# Patient Record
Sex: FEMALE | Race: WHITE | NOT HISPANIC OR LATINO | Employment: UNEMPLOYED | URBAN - METROPOLITAN AREA
[De-identification: names, ages, dates, MRNs, and addresses within clinical notes are randomized per-mention and may not be internally consistent; named-entity substitution may affect disease eponyms.]

---

## 2018-04-03 ENCOUNTER — HOSPITAL ENCOUNTER (EMERGENCY)
Facility: OTHER | Age: 23
Discharge: HOME | End: 2018-04-03
Attending: EMERGENCY MEDICINE
Payer: MEDICAID

## 2018-04-03 VITALS
OXYGEN SATURATION: 97 % | HEART RATE: 57 BPM | RESPIRATION RATE: 16 BRPM | SYSTOLIC BLOOD PRESSURE: 101 MMHG | WEIGHT: 214 LBS | DIASTOLIC BLOOD PRESSURE: 52 MMHG | HEIGHT: 63 IN | TEMPERATURE: 98 F | BODY MASS INDEX: 37.92 KG/M2

## 2018-04-03 DIAGNOSIS — R10.9 ABDOMINAL PAIN DURING PREGNANCY IN FIRST TRIMESTER: Primary | ICD-10-CM

## 2018-04-03 DIAGNOSIS — O26.891 ABDOMINAL PAIN DURING PREGNANCY IN FIRST TRIMESTER: Primary | ICD-10-CM

## 2018-04-03 LAB
ALBUMIN SERPL BCP-MCNC: 3.4 G/DL
ALP SERPL-CCNC: 58 U/L
ALT SERPL W/O P-5'-P-CCNC: 19 U/L
ANION GAP SERPL CALC-SCNC: 8 MMOL/L
AST SERPL-CCNC: 16 U/L
B-HCG UR QL: POSITIVE
BASOPHILS # BLD AUTO: 0.04 K/UL
BASOPHILS NFR BLD: 0.3 %
BILIRUB SERPL-MCNC: 0.2 MG/DL
BILIRUB UR QL STRIP: NEGATIVE
BUN SERPL-MCNC: 8 MG/DL
CALCIUM SERPL-MCNC: 9.3 MG/DL
CHLORIDE SERPL-SCNC: 108 MMOL/L
CLARITY UR: CLEAR
CO2 SERPL-SCNC: 20 MMOL/L
COLOR UR: YELLOW
CREAT SERPL-MCNC: 0.6 MG/DL
CTP QC/QA: YES
DIFFERENTIAL METHOD: ABNORMAL
EOSINOPHIL # BLD AUTO: 0.1 K/UL
EOSINOPHIL NFR BLD: 1.2 %
ERYTHROCYTE [DISTWIDTH] IN BLOOD BY AUTOMATED COUNT: 13.9 %
EST. GFR  (AFRICAN AMERICAN): >60 ML/MIN/1.73 M^2
EST. GFR  (NON AFRICAN AMERICAN): >60 ML/MIN/1.73 M^2
GLUCOSE SERPL-MCNC: 93 MG/DL
GLUCOSE UR QL STRIP: NEGATIVE
HCT VFR BLD AUTO: 36.8 %
HGB BLD-MCNC: 12 G/DL
HGB UR QL STRIP: NEGATIVE
KETONES UR QL STRIP: NEGATIVE
LEUKOCYTE ESTERASE UR QL STRIP: NEGATIVE
LIPASE SERPL-CCNC: 11 U/L
LYMPHOCYTES # BLD AUTO: 3.5 K/UL
LYMPHOCYTES NFR BLD: 29.5 %
MCH RBC QN AUTO: 28 PG
MCHC RBC AUTO-ENTMCNC: 32.6 G/DL
MCV RBC AUTO: 86 FL
MONOCYTES # BLD AUTO: 0.9 K/UL
MONOCYTES NFR BLD: 7.2 %
NEUTROPHILS # BLD AUTO: 7.3 K/UL
NEUTROPHILS NFR BLD: 61.6 %
NITRITE UR QL STRIP: NEGATIVE
PH UR STRIP: 8.5 [PH] (ref 5–8)
PLATELET # BLD AUTO: 270 K/UL
PMV BLD AUTO: 10.3 FL
POTASSIUM SERPL-SCNC: 4 MMOL/L
PROT SERPL-MCNC: 6.5 G/DL
PROT UR QL STRIP: NEGATIVE
RBC # BLD AUTO: 4.29 M/UL
SODIUM SERPL-SCNC: 136 MMOL/L
SP GR UR STRIP: 1.02 (ref 1–1.03)
URN SPEC COLLECT METH UR: NORMAL
UROBILINOGEN UR STRIP-ACNC: NEGATIVE EU/DL
WBC # BLD AUTO: 11.88 K/UL

## 2018-04-03 PROCEDURE — 81025 URINE PREGNANCY TEST: CPT | Performed by: PHYSICIAN ASSISTANT

## 2018-04-03 PROCEDURE — 85025 COMPLETE CBC W/AUTO DIFF WBC: CPT

## 2018-04-03 PROCEDURE — 99283 EMERGENCY DEPT VISIT LOW MDM: CPT | Mod: 25

## 2018-04-03 PROCEDURE — 83690 ASSAY OF LIPASE: CPT

## 2018-04-03 PROCEDURE — 80053 COMPREHEN METABOLIC PANEL: CPT

## 2018-04-03 PROCEDURE — 25000003 PHARM REV CODE 250: Performed by: PHYSICIAN ASSISTANT

## 2018-04-03 PROCEDURE — 81003 URINALYSIS AUTO W/O SCOPE: CPT

## 2018-04-03 RX ORDER — ONDANSETRON 4 MG/1
4 TABLET, ORALLY DISINTEGRATING ORAL
Status: COMPLETED | OUTPATIENT
Start: 2018-04-03 | End: 2018-04-03

## 2018-04-03 RX ORDER — ACETAMINOPHEN 500 MG
1000 TABLET ORAL
Status: COMPLETED | OUTPATIENT
Start: 2018-04-03 | End: 2018-04-03

## 2018-04-03 RX ORDER — ACETAMINOPHEN 500 MG
500 TABLET ORAL EVERY 6 HOURS PRN
Qty: 20 TABLET | Refills: 0 | Status: SHIPPED | OUTPATIENT
Start: 2018-04-03

## 2018-04-03 RX ADMIN — ONDANSETRON 4 MG: 4 TABLET, ORALLY DISINTEGRATING ORAL at 03:04

## 2018-04-03 RX ADMIN — ACETAMINOPHEN 1000 MG: 500 TABLET ORAL at 03:04

## 2018-04-03 NOTE — ED NOTES
Pt reporting she is 10 weeks pregnant, presenting to ED with reporting midline abdominal pain with pain extending into lower midline pelvic region x 2 days. Denies nausea, vomiting, diarrhea, or vaginal bleeding. Pt AAOx4 and appropriate at this time. Respirations even and unlabored. No acute distress noted.

## 2018-04-03 NOTE — ED PROVIDER NOTES
Encounter Date: 4/3/2018       History     Chief Complaint   Patient presents with    Abdominal Pain     10 weeks pregnant presents with abdominal pain left side and pelvic region. NO back pain, NO bleeding.      Patient is a 22 y.o. Female,  at approximately 10wga, presenting to the emergency department with complaints of abdominal pain in pregnancy.  The patient states her symptoms started approximately 3 days ago and have progressively worsened.  She states that they occur intermittently.  He states the pain is most significant in the left upper quadrant in addition to her lower abdomen.  She denies dysuria hematuria.  She denies any vaginal bleeding.  She states that she has not seen a GYN for the past 4 weeks that she is early in the process of relocating her home.  She denies any alcohol use, but does report daily tobacco use in pregnancy.  She denies any drug use.      The history is provided by the patient.     Review of patient's allergies indicates:   Allergen Reactions    Ceclor [cefaclor]      Past Medical History:   Diagnosis Date    Asthma     Polycystic ovarian disease      Past Surgical History:   Procedure Laterality Date     SECTION      TONSILLECTOMY       Family History   Problem Relation Age of Onset    Glaucoma Maternal Grandfather     Glaucoma Paternal Grandfather      Social History   Substance Use Topics    Smoking status: Current Every Day Smoker     Packs/day: 0.50     Types: Cigarettes    Smokeless tobacco: Never Used    Alcohol use Yes     Review of Systems   Constitutional: Negative for activity change, appetite change, chills, fatigue and fever.   HENT: Negative for congestion, rhinorrhea and sore throat.    Eyes: Negative for photophobia and visual disturbance.   Respiratory: Negative for cough, shortness of breath and wheezing.    Cardiovascular: Negative for chest pain.   Gastrointestinal: Positive for abdominal pain. Negative for diarrhea, nausea and  vomiting.   Genitourinary: Negative for dysuria, hematuria and urgency.   Musculoskeletal: Negative for back pain, myalgias and neck pain.   Skin: Negative for color change and wound.   Neurological: Negative for weakness and headaches.   Psychiatric/Behavioral: Negative for agitation and confusion.       Physical Exam     Initial Vitals [04/03/18 1438]   BP Pulse Resp Temp SpO2   108/66 63 17 99 °F (37.2 °C) 97 %      MAP       80         Physical Exam    Nursing note and vitals reviewed.  Constitutional: Vital signs are normal. She appears well-developed and well-nourished. She is not diaphoretic. She is Obese . She is cooperative.  Non-toxic appearance. She does not have a sickly appearance. She does not appear ill. No distress.   Well appearing, obese,  female unaccompanied in the emergency room.  Speaking clear and full sentences.  No acute distress.   HENT:   Head: Normocephalic and atraumatic.   Right Ear: External ear normal.   Left Ear: External ear normal.   Nose: Nose normal.   Mouth/Throat: Oropharynx is clear and moist.   Eyes: Conjunctivae and EOM are normal.   Neck: Normal range of motion. Neck supple.   Cardiovascular: Normal rate, regular rhythm and normal heart sounds.   Pulmonary/Chest: Breath sounds normal. No respiratory distress. She has no wheezes.   Abdominal: Soft. Bowel sounds are normal. She exhibits no distension. There is tenderness in the suprapubic area. There is no rebound and no guarding.   Musculoskeletal: Normal range of motion.   Neurological: She is alert and oriented to person, place, and time. GCS eye subscore is 4. GCS verbal subscore is 5. GCS motor subscore is 6.   Skin: Skin is warm.   Psychiatric: She has a normal mood and affect. Her behavior is normal. Judgment and thought content normal.         ED Course   Procedures  Labs Reviewed   CBC W/ AUTO DIFFERENTIAL - Abnormal; Notable for the following:        Result Value    Hematocrit 36.8 (*)     All other  components within normal limits   COMPREHENSIVE METABOLIC PANEL - Abnormal; Notable for the following:     CO2 20 (*)     Albumin 3.4 (*)     All other components within normal limits   POCT URINE PREGNANCY - Abnormal; Notable for the following:     POC Preg Test, Ur Positive (*)     All other components within normal limits   URINALYSIS   LIPASE             Medical Decision Making:   Initial Assessment:   Urgent evaluation of a 22-year-old female,  at approximately 10 weeks' gestational age, presenting to the emergency department with complaints of abdominal pain in pregnancy.  Patient is afebrile, nontoxic appearing, hemodynamically stable.  Physical exam reveals tenderness to palpation of the suprapubic abdomen is no rebound, guarding, mass.  Will plan for labs, UA, Tylenol and reassess.  Clinical Tests:   Lab Tests: Ordered and Reviewed  The following lab test(s) were unremarkable: UPT, Urinalysis, CBC, CMP and Lipase  ED Management:  UPT is positive. CBC shows no leukocytosis, normal H&H.  CMP shows no significant findings.  Lipase is normal.  UA shows no significant findings.  On reassessment, patient reports full resolution of her symptoms.  At this time, no further testing or imaging is warranted.  She is given a prescription for Tylenol and prenatal vitamins.  Counseled on the importance of smoking cessation in pregnancy.  Advised to obtain close follow-up with OB/GYN. The patient was instructed to follow up with a primary care provider in 2 days or to return to the emergency department for worsening symptoms. The treatment plan was discussed with the patient who demonstrated understanding and comfort with plan. The patient's history, physical exam, and plan of care was discussed with and agreed upon with my supervising physician.    Other:   I have discussed this case with another health care provider.       <> Summary of the Discussion: Dr. Marte  This note was created using Dragon Medical  Dictation. There may be typographical errors secondary to dictation.                       Clinical Impression:     1. Abdominal pain during pregnancy in first trimester       Disposition:   Disposition: Discharged  Condition: Stable                        Nanette Cisneros PA-C  04/03/18 7686

## 2018-05-08 ENCOUNTER — OFFICE VISIT (OUTPATIENT)
Dept: OBSTETRICS AND GYNECOLOGY | Facility: CLINIC | Age: 23
End: 2018-05-08
Payer: MEDICAID

## 2018-05-08 ENCOUNTER — DOCUMENTATION ONLY (OUTPATIENT)
Dept: OBSTETRICS AND GYNECOLOGY | Facility: CLINIC | Age: 23
End: 2018-05-08

## 2018-05-08 VITALS
SYSTOLIC BLOOD PRESSURE: 104 MMHG | HEIGHT: 63 IN | BODY MASS INDEX: 38.95 KG/M2 | WEIGHT: 219.81 LBS | DIASTOLIC BLOOD PRESSURE: 62 MMHG

## 2018-05-08 DIAGNOSIS — Z32.01 POSITIVE PREGNANCY TEST: Primary | ICD-10-CM

## 2018-05-08 DIAGNOSIS — O21.9 NAUSEA AND VOMITING DURING PREGNANCY: ICD-10-CM

## 2018-05-08 DIAGNOSIS — O34.219 MATERNAL CARE FOR SCAR FROM PREVIOUS CESAREAN DELIVERY, UNSPECIFIED PRIOR CESAREAN DELIVERY TYPE: ICD-10-CM

## 2018-05-08 DIAGNOSIS — O35.2XX0 MATERNAL CARE FOR SUSPECTED HEREDITARY DISEASE IN FETUS, SINGLE OR UNSPECIFIED FETUS: ICD-10-CM

## 2018-05-08 DIAGNOSIS — O99.342 MENTAL DISORDER AFFECTING PREGNANCY IN SECOND TRIMESTER: ICD-10-CM

## 2018-05-08 DIAGNOSIS — O09.292 PREGNANCY WITH POOR REPRODUCTIVE HISTORY IN SECOND TRIMESTER: ICD-10-CM

## 2018-05-08 DIAGNOSIS — O99.332 PREGNANCY COMPLICATED BY TOBACCO USE IN SECOND TRIMESTER: ICD-10-CM

## 2018-05-08 PROBLEM — O99.340 MENTAL DISORDER AFFECTING PREGNANCY, ANTEPARTUM: Status: ACTIVE | Noted: 2018-05-08

## 2018-05-08 PROBLEM — O99.330 TOBACCO USE AFFECTING PREGNANCY, ANTEPARTUM: Status: ACTIVE | Noted: 2018-05-08

## 2018-05-08 PROBLEM — O09.299 PREGNANCY WITH POOR REPRODUCTIVE HISTORY, ANTEPARTUM: Status: ACTIVE | Noted: 2018-05-08

## 2018-05-08 LAB
B-HCG UR QL: POSITIVE
CTP QC/QA: YES

## 2018-05-08 PROCEDURE — 87491 CHLMYD TRACH DNA AMP PROBE: CPT

## 2018-05-08 PROCEDURE — 99203 OFFICE O/P NEW LOW 30 MIN: CPT | Mod: S$PBB,TH,, | Performed by: OBSTETRICS & GYNECOLOGY

## 2018-05-08 PROCEDURE — 81025 URINE PREGNANCY TEST: CPT | Mod: PBBFAC,PN | Performed by: OBSTETRICS & GYNECOLOGY

## 2018-05-08 PROCEDURE — 99213 OFFICE O/P EST LOW 20 MIN: CPT | Mod: PBBFAC,TH,PN | Performed by: OBSTETRICS & GYNECOLOGY

## 2018-05-08 PROCEDURE — 88175 CYTOPATH C/V AUTO FLUID REDO: CPT

## 2018-05-08 PROCEDURE — 99999 PR PBB SHADOW E&M-EST. PATIENT-LVL III: CPT | Mod: PBBFAC,,, | Performed by: OBSTETRICS & GYNECOLOGY

## 2018-05-08 PROCEDURE — 87086 URINE CULTURE/COLONY COUNT: CPT

## 2018-05-08 RX ORDER — PYRIDOXINE HCL (VITAMIN B6) 25 MG
25 TABLET ORAL NIGHTLY
Qty: 30 TABLET | Refills: 2 | Status: SHIPPED | OUTPATIENT
Start: 2018-05-08 | End: 2018-08-06

## 2018-05-08 NOTE — PROGRESS NOTES
Chief Complaint   Patient presents with    Amenorrhea       HPI:  22 y.o. female  presents as a new patient for confirmation of pregnancy.    Patient's last menstrual period was 2017 (within weeks).  Patient has irregular cycles.  She often skips cycles.    - Nausea:  Yes  - Vomiting: No  - Cramping: No  - Bleeding:  No    - Patient had u/s at imaging center in [place] that gave an JUANA of 10/24/2018    - Denies family history of bleeding disorders    - Patient's sibling born with lethal genetic condition causing what sounds like hydrocephalus and cardiac defects  - Patient does not remember the name of the diagnosis, but she states that her mom does know the diagnosis    - Prior pregnancy complicated by placental insufficiency, IUGR, and oligohydramnios  - Prior child was 4#11-oz at birth at about 38 weeks EGA  - Denies any blood pressure issues  - Delivery [facility] C/S [facility]    - History of bipolar, depression, and anxiety  - Last saw a psychiatrist 2 years ago  - Previously on zoloft, but not on meds recently    - Desires assistance with tobacco cessation    - Patient's mother with history of breast surgery in her 30s  - Patient is unsure if surgery was for cancer or benign findings    Contraception: None  Pap: No result found, Done today       Past Medical History:   Diagnosis Date    Asthma     Polycystic ovarian disease      Past Surgical History:   Procedure Laterality Date     SECTION      TONSILLECTOMY         Social History   Substance Use Topics    Smoking status: Current Every Day Smoker     Packs/day: 0.50     Types: Cigarettes    Smokeless tobacco: Never Used    Alcohol use No     Family History   Problem Relation Age of Onset    Glaucoma Maternal Grandfather     Glaucoma Paternal Grandfather     Breast cancer Neg Hx     Colon cancer Neg Hx     Ovarian cancer Neg Hx      OB History    Para Term  AB Living   2 0 0         SAB TAB Ectopic Multiple Live  "Births                  # Outcome Date GA Lbr Brent/2nd Weight Sex Delivery Anes PTL Lv   2 Current            1                    MEDICATIONS: Reviewed with patient.  ALLERGIES: Ceclor [cefaclor]     ROS:  Review of Systems   Constitutional: Negative for fever.   Respiratory: Negative for shortness of breath.    Cardiovascular: Negative for chest pain.   Gastrointestinal: Positive for nausea. Negative for abdominal pain and vomiting.   Endocrine: Negative for hot flashes.   Genitourinary: Negative for pelvic pain and vaginal bleeding.   Neurological: Negative for headaches.   Hematological: Does not bruise/bleed easily.   Psychiatric/Behavioral: Negative for depression.   Breast: Negative for breast mass, breast pain, nipple discharge and skin changes      PHYSICAL EXAM:    /62   Ht 5' 3" (1.6 m)   Wt 99.7 kg (219 lb 12.8 oz)   LMP 2017 (Within Weeks)   BMI 38.94 kg/m²     Physical Exam:   Constitutional: She is oriented to person, place, and time. She appears well-developed.   No fever    HENT:   Head: Normocephalic.     Neck: No thyromegaly present.    Cardiovascular: Normal rate.     Pulmonary/Chest: Effort normal. Right breast exhibits no mass, no nipple discharge, no skin change, no tenderness and no swelling. Left breast exhibits no mass, no nipple discharge, no skin change, no tenderness and no swelling. Breasts are symmetrical.        Abdominal: She exhibits no mass. There is no hepatosplenomegaly. There is no tenderness.     Genitourinary: Vagina normal. Uterus is enlarged. Uterus is not tender. Cervix is normal. Right adnexum displays no tenderness and no fullness. Left adnexum displays no tenderness and no fullness. No vaginal discharge found. Additional cervical findings: pap smear done  Genitourinary Comments: External genitalia: Normal  Urethra: No tenderness; normal meatus  Bladder: No tenderness              Lymphadenopathy:     She has no cervical adenopathy.    Neurological: " She is alert and oriented to person, place, and time.     Psychiatric: She has a normal mood and affect.         ASSESSMENT & PLAN:   Positive pregnancy test  -     POCT Urine Pregnancy  -     Liquid-based pap smear, screening  -     C. trachomatis/N. gonorrhoeae by AMP DNA Cervix  -     CBC auto differential; Future; Expected date: 2018  -     Hepatitis B surface antigen; Future; Expected date: 2018  -     HIV-1 and HIV-2 antibodies; Future; Expected date: 2018  -     RPR; Future; Expected date: 2018  -     US MFM Procedure (Viewpoint); Future  -     Rubella antibody, IgG; Future; Expected date: 2018  -     Type & Screen - Ob Profile; Future; Expected date: 2018  -     Urine culture  -     Varicella zoster antibody, IgG; Future; Expected date: 2018  -     prenatal 25-iron fum-folic-dha (PRENATAL-1) -200 mg-mcg-mg Cap; Take 1 capsule by mouth once daily.  Dispense: 30 capsule; Refill: 9  -     Maternal Quad Screen; Future; Expected date: 2018    Nausea and vomiting during pregnancy  -     doxylamine succinate 25 mg tablet; Take 1 tablet (25 mg total) by mouth every evening.  Dispense: 30 tablet; Refill: 2  -     pyridoxine, vitamin B6, (B-6) 25 MG Tab; Take 1 tablet (25 mg total) by mouth every evening.  Dispense: 30 tablet; Refill: 2    Maternal care for suspected hereditary disease in fetus, single or unspecified fetus  -     US MFM Procedure (Viewpoint); Future    Mental disorder affecting pregnancy in second trimester    Maternal care for scar from previous  delivery, unspecified prior  delivery type    Pregnancy with poor reproductive history in second trimester    Pregnancy complicated by tobacco use in second trimester        - UPT positive; +FHTs on doptone  - JUANA=10/24/2018 --> EGA=15.6.  Will use u/s-based JUANA.  Records requested.  - PNV  - Initial prenatal labs  - Pain and bleeding precautions given  - Return to clinic in 4 weeks    -  Aneuploidy screening: Quad screen ordered.  - Patient will find out more information about her siblings diagnosis.  - Patient will likely need MFM consult and possible cffDNA testing once more information is obtained.    - Request c/s records    - Patient will review psychiatrists covered by her insurance and request a referral when she finds one she wants to see  - Counseled patient on risk of  transition with SSRI use in pregnancy balanced versus risk of worsening anxiety and depression in pregnancy    - VitB6 and unisom for N/V    - Patient will likely need serial growth u/s starting at 32 weeks and PNT due to history of IUGR and placental insufficiency in prior pregnancy    - Patient will clarify breast diagnosis with her mother  - If her mother did have breast cancer in her 30s, patient would need early mammogram screening and would be candidate for BRCA testing    NEW PREGNANCY COUNSELING  Patient was counseled today on:  - Routine prenatal blood tests including HIV and anticipated course of prenatal care  - Prenatal vitamins and folic acid  - Weight gain, nutrition, and exercise  - Seafood and mercury  - Properly heating deli and prepared meats and avoiding unrefrigerated deli  meats, cheeses, and milk products,   - Avoiding cat litter and raw meats due to risk of Toxoplasmosis precautions   - Accuracy of the LMP-based JUANA and the value of an early TV-u/s  - Aneuploidy and neural tube screening -- cffDNA, sequential screening, and AFP screen at 15 weeks  - OTC medication in the first trimester  - Harmful effects of smoking, etOH, and recreational drugs  - MFM u/s  at 18-20 weeks.  - Common complaints of pregnancy  - Seat belt use  - Childbirth classes and hospital facilities  - All questions were answered    Total visit time was 30 minutes with greater than 50% of time dedicated to counseling.

## 2018-05-09 LAB
C TRACH DNA SPEC QL NAA+PROBE: NOT DETECTED
N GONORRHOEA DNA SPEC QL NAA+PROBE: NOT DETECTED

## 2018-05-10 LAB — BACTERIA UR CULT: NORMAL

## 2018-05-30 ENCOUNTER — PATIENT MESSAGE (OUTPATIENT)
Dept: MATERNAL FETAL MEDICINE | Facility: CLINIC | Age: 23
End: 2018-05-30

## 2018-05-30 ENCOUNTER — TELEPHONE (OUTPATIENT)
Dept: OBSTETRICS AND GYNECOLOGY | Facility: CLINIC | Age: 23
End: 2018-05-30

## 2018-05-30 NOTE — TELEPHONE ENCOUNTER
----- Message from Karine Dunn RN sent at 5/30/2018  2:51 PM CDT -----  FYI - pt canceled her Anatomy u/s today (it was scheduled for today) - we sent her a message to call us back to r/s    Karine

## 2018-05-31 ENCOUNTER — TELEPHONE (OUTPATIENT)
Dept: OBSTETRICS AND GYNECOLOGY | Facility: CLINIC | Age: 23
End: 2018-05-31

## 2018-05-31 NOTE — TELEPHONE ENCOUNTER
----- Message from Teresa Hong sent at 5/30/2018  3:54 PM CDT -----  Contact: Concepcion  Name of Who is Calling: Concepcion       What is the request in detail: Patient states she is 5 months she states she has a Yeast infection and she states she has having vaginal bleeding she is requesting medication be called in for her        Can the clinic reply by [facility]: NO       What Number to Call Back if not in [facility]:[phone]

## 2018-05-31 NOTE — TELEPHONE ENCOUNTER
Returned pt call and pt stated that she has a yeast infection. Informed pt that she have to come in and be seen. Pt stated that she will call back to schedule appt

## 2018-05-31 NOTE — TELEPHONE ENCOUNTER
Spoke with pt and she stated she believes she has a yeast infection and she is having stabbing pains in her abdomen. Offered her an appt at the [facility] tomorrow. Pt declined and stated she would just go to the ER and if necessary will make an appt next week to see Dr Carson

## 2018-06-02 ENCOUNTER — PATIENT MESSAGE (OUTPATIENT)
Dept: OBSTETRICS AND GYNECOLOGY | Facility: CLINIC | Age: 23
End: 2018-06-02

## 2018-06-02 DIAGNOSIS — O35.2XX0 MATERNAL CARE FOR SUSPECTED HEREDITARY DISEASE IN FETUS, SINGLE OR UNSPECIFIED FETUS: Primary | ICD-10-CM

## 2018-06-02 DIAGNOSIS — Z82.79 FAMILY HISTORY OF OTHER CONGENITAL MALFORMATIONS, DEFORMATIONS AND CHROMOSOMAL ABNORMALITIES: ICD-10-CM

## 2018-06-05 DIAGNOSIS — Z36.89 ENCOUNTER FOR FETAL ANATOMIC SURVEY: Primary | ICD-10-CM

## 2018-06-08 ENCOUNTER — PATIENT MESSAGE (OUTPATIENT)
Dept: OBSTETRICS AND GYNECOLOGY | Facility: CLINIC | Age: 23
End: 2018-06-08

## 2018-06-08 ENCOUNTER — TELEPHONE (OUTPATIENT)
Dept: OBSTETRICS AND GYNECOLOGY | Facility: CLINIC | Age: 23
End: 2018-06-08

## 2018-06-08 NOTE — TELEPHONE ENCOUNTER
----- Message from Emelia Keys sent at 6/8/2018  8:30 AM CDT -----  Contact: ANU  Pt was a no show for her MFM appt. sh

## 2018-06-15 ENCOUNTER — TELEPHONE (OUTPATIENT)
Dept: OBSTETRICS AND GYNECOLOGY | Facility: CLINIC | Age: 23
End: 2018-06-15

## 2018-06-15 NOTE — TELEPHONE ENCOUNTER
----- Message from Kirsten Singh sent at 6/15/2018  2:13 PM CDT -----            Name of Who is Calling: arleth      What is the request in detail: pt. Would like sejal for ob appt.pt. Would like to come in on Tuesday.06/19/18 Please call to discuss      Can the clinic reply by [facility]: no      What [facility]: [phone]

## 2018-06-26 ENCOUNTER — TELEPHONE (OUTPATIENT)
Dept: OBSTETRICS AND GYNECOLOGY | Facility: CLINIC | Age: 23
End: 2018-06-26

## 2018-06-26 NOTE — TELEPHONE ENCOUNTER
----- Message from Emelia Keys sent at 6/26/2018  8:47 AM CDT -----  Contact: ANU  Pt was a no show for her MFM appt. sh

## 2018-06-29 ENCOUNTER — HOSPITAL ENCOUNTER (EMERGENCY)
Facility: OTHER | Age: 23
Discharge: HOME | End: 2018-06-29
Attending: OBSTETRICS & GYNECOLOGY
Payer: MEDICAID

## 2018-06-29 ENCOUNTER — TELEPHONE (OUTPATIENT)
Dept: OBSTETRICS AND GYNECOLOGY | Facility: CLINIC | Age: 23
End: 2018-06-29

## 2018-06-29 DIAGNOSIS — O9A.312: ICD-10-CM

## 2018-06-29 DIAGNOSIS — Z36.89 NST (NON-STRESS TEST) REACTIVE: ICD-10-CM

## 2018-06-29 DIAGNOSIS — T74.91XA DOMESTIC VIOLENCE OF ADULT, INITIAL ENCOUNTER: Primary | ICD-10-CM

## 2018-06-29 LAB
ABO + RH BLD: NORMAL
AMPHET+METHAMPHET UR QL: NEGATIVE
BARBITURATES UR QL SCN>200 NG/ML: NEGATIVE
BASOPHILS # BLD AUTO: 0.01 K/UL
BASOPHILS NFR BLD: 0.1 %
BENZODIAZ UR QL SCN>200 NG/ML: NEGATIVE
BLD GP AB SCN CELLS X3 SERPL QL: NORMAL
BZE UR QL SCN: NEGATIVE
CANNABINOIDS UR QL SCN: NORMAL
CREAT UR-MCNC: 236.7 MG/DL
DIFFERENTIAL METHOD: ABNORMAL
EOSINOPHIL # BLD AUTO: 0.1 K/UL
EOSINOPHIL NFR BLD: 1.1 %
ERYTHROCYTE [DISTWIDTH] IN BLOOD BY AUTOMATED COUNT: 13.7 %
ETHANOL UR-MCNC: <10 MG/DL
HCT VFR BLD AUTO: 38.3 %
HGB BLD-MCNC: 12.8 G/DL
HIV1+2 IGG SERPL QL IA.RAPID: NEGATIVE
LYMPHOCYTES # BLD AUTO: 2.2 K/UL
LYMPHOCYTES NFR BLD: 17 %
MCH RBC QN AUTO: 29.4 PG
MCHC RBC AUTO-ENTMCNC: 33.4 G/DL
MCV RBC AUTO: 88 FL
METHADONE UR QL SCN>300 NG/ML: NEGATIVE
MONOCYTES # BLD AUTO: 0.9 K/UL
MONOCYTES NFR BLD: 6.8 %
NEUTROPHILS # BLD AUTO: 9.8 K/UL
NEUTROPHILS NFR BLD: 74.7 %
OPIATES UR QL SCN: NEGATIVE
PCP UR QL SCN>25 NG/ML: NEGATIVE
PLATELET # BLD AUTO: 264 K/UL
PMV BLD AUTO: 10.7 FL
RBC # BLD AUTO: 4.36 M/UL
TOXICOLOGY INFORMATION: NORMAL
WBC # BLD AUTO: 13.08 K/UL

## 2018-06-29 PROCEDURE — 87086 URINE CULTURE/COLONY COUNT: CPT

## 2018-06-29 PROCEDURE — 59025 FETAL NON-STRESS TEST: CPT | Mod: 26,,, | Performed by: OBSTETRICS & GYNECOLOGY

## 2018-06-29 PROCEDURE — 59025 FETAL NON-STRESS TEST: CPT

## 2018-06-29 PROCEDURE — 85025 COMPLETE CBC W/AUTO DIFF WBC: CPT

## 2018-06-29 PROCEDURE — 86703 HIV-1/HIV-2 1 RESULT ANTBDY: CPT

## 2018-06-29 PROCEDURE — 86850 RBC ANTIBODY SCREEN: CPT

## 2018-06-29 PROCEDURE — 80307 DRUG TEST PRSMV CHEM ANLYZR: CPT

## 2018-06-29 PROCEDURE — 86592 SYPHILIS TEST NON-TREP QUAL: CPT

## 2018-06-29 PROCEDURE — 99284 EMERGENCY DEPT VISIT MOD MDM: CPT | Mod: 25,,, | Performed by: OBSTETRICS & GYNECOLOGY

## 2018-06-29 PROCEDURE — 99285 EMERGENCY DEPT VISIT HI MDM: CPT | Mod: 25
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
## 2018-06-29 NOTE — TELEPHONE ENCOUNTER
----- Message from Marino Walker MD sent at 6/29/2018  1:56 PM CDT -----  Hello!    This patient has a complicated social situation and needs to be seen in clinic soon. If possible, will you schedule her with Dr. Carson for ED follow up visit some time next week?    Thank you,  Marino Walker M.D.  Obstetrics & Gynecology  PGY-1
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
## 2018-06-29 NOTE — TELEPHONE ENCOUNTER
Called pt to schedule f/u per Dr. Walker's request pt declined appointment citing she is moving and no longer needs this appointment.

## 2018-06-30 LAB
BACTERIA UR CULT: NORMAL
BACTERIA UR CULT: NORMAL

## 2018-07-02 LAB — RPR SER QL: NORMAL

## 2018-07-03 ENCOUNTER — SOCIAL WORK (OUTPATIENT)
Dept: CASE MANAGEMENT | Facility: OTHER | Age: 23
End: 2018-07-03
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
## 2019-04-15 PROBLEM — O09.299 PREGNANCY WITH POOR REPRODUCTIVE HISTORY, ANTEPARTUM: Status: RESOLVED | Noted: 2018-05-08 | Resolved: 2019-04-15

## 2023-01-09 ENCOUNTER — HOSPITAL ENCOUNTER (EMERGENCY)
Facility: HOSPITAL | Age: 28
Discharge: HOME | End: 2023-01-09
Attending: FAMILY MEDICINE
Payer: MEDICAID

## 2023-01-09 VITALS
WEIGHT: 176.38 LBS | HEART RATE: 84 BPM | SYSTOLIC BLOOD PRESSURE: 120 MMHG | HEIGHT: 60 IN | DIASTOLIC BLOOD PRESSURE: 72 MMHG | OXYGEN SATURATION: 98 % | BODY MASS INDEX: 34.63 KG/M2 | RESPIRATION RATE: 16 BRPM | TEMPERATURE: 98 F

## 2023-01-09 DIAGNOSIS — F41.9 ANXIETY: Primary | ICD-10-CM

## 2023-01-09 LAB
APPEARANCE UR: CLEAR
BACTERIA #/AREA URNS AUTO: ABNORMAL /HPF
BILIRUB UR QL STRIP.AUTO: NEGATIVE MG/DL
COLOR UR AUTO: YELLOW
GLUCOSE UR QL STRIP.AUTO: NORMAL MG/DL
HYALINE CASTS #/AREA URNS LPF: ABNORMAL /LPF
KETONES UR QL STRIP.AUTO: NEGATIVE MG/DL
LEUKOCYTE ESTERASE UR QL STRIP.AUTO: NEGATIVE UNIT/L
MUCOUS THREADS URNS QL MICRO: ABNORMAL /LPF
NITRITE UR QL STRIP.AUTO: NEGATIVE
PH UR STRIP.AUTO: 7.5 [PH]
PROT UR QL STRIP.AUTO: NEGATIVE MG/DL
RBC #/AREA URNS AUTO: ABNORMAL /HPF
RBC UR QL AUTO: NEGATIVE UNIT/L
SP GR UR STRIP.AUTO: 1.02
SQUAMOUS #/AREA URNS LPF: ABNORMAL /HPF
UROBILINOGEN UR STRIP-ACNC: NORMAL MG/DL
WBC #/AREA URNS AUTO: ABNORMAL /HPF

## 2023-01-09 PROCEDURE — 99284 EMERGENCY DEPT VISIT MOD MDM: CPT | Mod: 25

## 2023-01-09 PROCEDURE — 96372 THER/PROPH/DIAG INJ SC/IM: CPT | Performed by: NURSE PRACTITIONER

## 2023-01-09 PROCEDURE — 81001 URINALYSIS AUTO W/SCOPE: CPT | Performed by: NURSE PRACTITIONER

## 2023-01-09 PROCEDURE — 63600175 PHARM REV CODE 636 W HCPCS: Performed by: NURSE PRACTITIONER

## 2023-01-09 RX ORDER — HYDROXYZINE 50 MG/ML
50 INJECTION, SOLUTION INTRAMUSCULAR
Status: COMPLETED | OUTPATIENT
Start: 2023-01-09 | End: 2023-01-09

## 2023-01-09 RX ADMIN — HYDROXYZINE HYDROCHLORIDE 50 MG: 50 INJECTION, SOLUTION INTRAMUSCULAR at 07:01

## 2023-01-10 NOTE — ED PROVIDER NOTES
"Encounter Date: 2023       History     Chief Complaint   Patient presents with    Medication Refill     25 WK OB REQUESTING ONE TIME DOSE OF MEDS; SUBUTEX AND VALIUM TILL SHE CAN REFILL MEDS IN AM.  CO ABD PAIN W SHARP PELVIC PAIN SINCE YESTERDAY.  DENIES VAG DC, NO VAG BLEEDING. +FETAL MOVEMENT.  CURRENTLY BEING TX FOR UTI, ALSO REQUESTING DOSE OF MACROBID FOR THAT.       Pt is a 27 y.o. female who presents to the [facility] ED requesting a dose of medication for her anxiety. Pt recently completed a drug rehab and is currently pregnant. Denies pelvic pain, vaginal bleeding, or vaginal discharge. Reports planning to fill her prescription for subutex and hydroxyzine in the morning but fears to be without medication tonight. Denies chest pain, SOB, weakness, dizziness, abdominal pain, SI, HI, auditory hallucinations, or visual hallucinations. Admits to "strong" fetal movement.    Review of patient's allergies indicates:   Allergen Reactions    Ceclor [cefaclor]      Past Medical History:   Diagnosis Date    Asthma     Polycystic ovarian disease      Past Surgical History:   Procedure Laterality Date     SECTION      TONSILLECTOMY       Family History   Problem Relation Age of Onset    Glaucoma Maternal Grandfather     Glaucoma Paternal Grandfather     Breast cancer Neg Hx     Colon cancer Neg Hx     Ovarian cancer Neg Hx      Social History     Tobacco Use    Smoking status: Every Day     Packs/day: 0.50     Types: Cigarettes    Smokeless tobacco: Never   Substance Use Topics    Alcohol use: Not Currently    Drug use: Not Currently     Comment: LAST USE X 1 WK.     Review of Systems   Constitutional:  Negative for chills, diaphoresis, fatigue and fever.   HENT:  Negative for facial swelling, rhinorrhea, sinus pressure, sinus pain, sore throat and trouble swallowing.    Respiratory:  Negative for cough, chest tightness, shortness of breath and wheezing.    Cardiovascular:  Negative for chest pain, palpitations " and leg swelling.   Gastrointestinal:  Negative for abdominal pain, diarrhea, nausea and vomiting.   Genitourinary:  Negative for dysuria, flank pain, frequency, hematuria and urgency.   Musculoskeletal:  Negative for arthralgias, back pain, joint swelling and myalgias.   Skin:  Negative for color change and rash.   Neurological:  Negative for dizziness, syncope, weakness and light-headedness.   Hematological:  Does not bruise/bleed easily.   Psychiatric/Behavioral:  The patient is nervous/anxious.    All other systems reviewed and are negative.    Physical Exam     Initial Vitals [01/09/23 1829]   BP Pulse Resp Temp SpO2   116/71 94 16 97.9 °F (36.6 °C) 100 %      MAP       --         Physical Exam    Nursing note and vitals reviewed.  Constitutional: She appears well-developed and well-nourished.   HENT:   Head: Normocephalic and atraumatic.   Nose: Nose normal.   Mouth/Throat: Oropharynx is clear and moist.   Eyes: Conjunctivae and EOM are normal. Pupils are equal, round, and reactive to light.   Neck: Neck supple.   Normal range of motion.  Cardiovascular:  Normal rate, regular rhythm, normal heart sounds and intact distal pulses.           Pulmonary/Chest: Effort normal and breath sounds normal. No respiratory distress. She has no wheezes. She has no rhonchi. She has no rales. She exhibits no tenderness.   Abdominal: Abdomen is soft and flat. Bowel sounds are normal. She exhibits no distension. There is no abdominal tenderness. There is no rebound, no guarding, no tenderness at McBurney's point and negative Miranda's sign. negative psoas sign  Musculoskeletal:         General: Normal range of motion.      Cervical back: Normal range of motion and neck supple.     Neurological: She is alert and oriented to person, place, and time. She has normal strength and normal reflexes.   Skin: Skin is warm and dry. Capillary refill takes less than 2 seconds.   Psychiatric: Her speech is normal and behavior is normal.  Judgment and thought content normal. Her mood appears anxious.       ED Course   Procedures  Labs Reviewed   URINALYSIS, REFLEX TO URINE CULTURE - Abnormal; Notable for the following components:       Result Value    Bacteria, UA Trace (*)     Squamous Epithelial Cells, UA Occ (*)     Mucous, UA Trace (*)     All other components within normal limits          Imaging Results    None          Medications   hydrOXYzine injection 50 mg (50 mg Intramuscular Given 1/9/23 1938)     Medical Decision Making:   Differential Diagnosis:   Anxiety  Clinical Tests:   Lab Tests: Ordered and Reviewed  ED Management:  7:58 PM Reassessed patient at this time. Reports condition has improved. I have discussed pt care with Dr. Valverde, ED physician, who agrees with plan to provide pt a dose of her medications tonight prior to discharge. Unfortunately Subutex is not available in the hospital formulary. Pt has received 1 dose of hydroxyzine and will fill the rest of her medication in the AM. Stressed need for pt to present to nearest ED for worsening anxiety or onset of HI/SI. Additionally I have offered to refer pt to OB Services at [facility]. Pt declined. Verbalizes plan to follow up with [facility] in the AM. Discussed with patient all pertinent ED information and results. Discussed diagnosis and treatment plan with patient. Follow up instructions and return to ED instruction have been given. All questions and concerns were addressed at this time. Patient voices understanding of information and instructions. Patient is comfortable with plan and discharge. Patient is stable for discharge.                           Clinical Impression:   Final diagnoses:  [F41.9] Anxiety (Primary)        ED Disposition Condition    Discharge Stable          ED Prescriptions    None       Follow-up Information       Follow up With Specialties Details Why Contact Info    Chani Alicia NP Family Medicine In 1 week  [address]  [address]    [place] [facility] [id number] [phone]      [facility] Emergency Medicine In 3 days As needed, If symptoms worsen [address] [facility] [address] [phone]             Nestor Clayton Jr., FNP  01/09/23 2021

## 2023-01-10 NOTE — DISCHARGE INSTRUCTIONS
Contact OB Clinic as planned in the Am to establish care.  Fill your home medications in the AM as planned.

## 2023-01-18 ENCOUNTER — HOSPITAL ENCOUNTER (EMERGENCY)
Facility: HOSPITAL | Age: 28
Discharge: HOME | End: 2023-01-18
Attending: OBSTETRICS & GYNECOLOGY
Payer: MEDICAID

## 2023-01-18 VITALS
HEIGHT: 63 IN | SYSTOLIC BLOOD PRESSURE: 122 MMHG | DIASTOLIC BLOOD PRESSURE: 58 MMHG | WEIGHT: 170 LBS | HEART RATE: 75 BPM | TEMPERATURE: 98 F | BODY MASS INDEX: 30.12 KG/M2

## 2023-01-18 DIAGNOSIS — Z32.01 POSITIVE PREGNANCY TEST: ICD-10-CM

## 2023-01-18 PROCEDURE — 99284 EMERGENCY DEPT VISIT MOD MDM: CPT | Mod: 25

## 2023-01-18 RX ORDER — GLYCERIN 1 G/1
1 SUPPOSITORY RECTAL
Qty: 10 SUPPOSITORY | Refills: 0 | Status: SHIPPED | OUTPATIENT
Start: 2023-01-18 | End: 2023-09-13 | Stop reason: ALTCHOICE

## 2023-01-18 RX ORDER — DOCUSATE SODIUM 100 MG/1
200 CAPSULE, LIQUID FILLED ORAL 2 TIMES DAILY
Qty: 120 CAPSULE | Refills: 3 | Status: SHIPPED | OUTPATIENT
Start: 2023-01-18 | End: 2023-02-17

## 2023-01-18 RX ORDER — BUPRENORPHINE 2 MG/1
8 TABLET SUBLINGUAL 3 TIMES DAILY
COMMUNITY

## 2023-01-18 NOTE — ED PROVIDER NOTES
"[facility] NOTE     Admit Date: 2023  [facility] Physician: Gonzalo Meek  Primary OBGYN: No Attending/OB Hospitalist Group Admit    Admit Diagnosis/Chief Complaint:  Constipation      Chief Complaint   Patient presents with    Constipation     Iup at 26.6 with c/o constipation       Hospital Course:  Concepcion Gonsales is a 27 y.o.  at 26w6d presents complaining of constipation after being placed on Suboxone  This IUP is complicated by opioid dependence in pregnancy.    Patient denies vaginal bleeding, leakage of fluid, and contractions.  Fetal Movement: normal.    BP (!) 122/58   Pulse 75   Temp 98.4 °F (36.9 °C) (Oral)   Ht 5' 3" (1.6 m)   Wt 77.1 kg (170 lb)   LMP  (LMP Unknown)   Breastfeeding No   BMI 30.11 kg/m²   Temp:  [98.4 °F (36.9 °C)] 98.4 °F (36.9 °C)  Pulse:  [75] 75  BP: (122)/(58) 122/58    General: in no apparent distress  Abdominal: soft, nontender, nondistended, no abnormal masses, no epigastric pain FHT present  Back: lumbar tenderness absent   CVA tenderness none  Extremeties no redness or tenderness in the calves or thighs no edema    SSE:   SVE:      FHT:   Appropriate for gestational age  TOCO: Contractions none      LABS:   No results found for this or any previous visit (from the past 24 hour(s)).  [unfilled]     Imaging Results    None          ASSESMENT: Concepcion Gonsales is a 27 y.o.   at 26w6d with constipation in pregnancy limited prenatal care  Observation in [facility]  Over-the-counter medications  Labor precautions reviewed with patient  ER precautions reviewed with patient  Patient was given an opportunity to ask questions  Patient is to [facility] with [facility]  Patient currently stable      Discharge Diagnosis:  Constipation in pregnancy  Status:Stable    Patient Instructions:       - Pt was given routine pregnancy instructions including to return to triage if she had any vaginal bleeding (other than spotting for the next 48hrs), any loss " of fluid like her water broke, decreased fetal movement, or contractions Q 5min lasting for 2 or more hours. Pt was also instructed to drink copious water. Patient voiced understanding of all these instructions and was subsequently discharged home.    She will follow up with her primary OB.      This note was created with the assistance of [employer] voice recognition software. There may be transcription errors as a result of using this technology however minimal. Effort has been made to assure accuracy of transcription but any obvious errors or omissions should be clarified with the author of the document.

## 2023-02-12 ENCOUNTER — HOSPITAL ENCOUNTER (EMERGENCY)
Facility: HOSPITAL | Age: 28
Discharge: HOME | End: 2023-02-12
Attending: SPECIALIST
Payer: MEDICAID

## 2023-02-12 VITALS — HEART RATE: 90 BPM | TEMPERATURE: 99 F | DIASTOLIC BLOOD PRESSURE: 64 MMHG | SYSTOLIC BLOOD PRESSURE: 116 MMHG

## 2023-02-12 LAB
CTP QC/QA: YES
RUPTURE OF MEMBRANE: NEGATIVE

## 2023-02-12 PROCEDURE — 99284 EMERGENCY DEPT VISIT MOD MDM: CPT | Mod: 25

## 2023-02-26 ENCOUNTER — HOSPITAL ENCOUNTER (EMERGENCY)
Facility: HOSPITAL | Age: 28
Discharge: HOME | End: 2023-02-26
Attending: EMERGENCY MEDICINE
Payer: MEDICAID

## 2023-02-26 VITALS
DIASTOLIC BLOOD PRESSURE: 80 MMHG | OXYGEN SATURATION: 10 % | TEMPERATURE: 98 F | HEIGHT: 63 IN | HEART RATE: 83 BPM | BODY MASS INDEX: 32.61 KG/M2 | SYSTOLIC BLOOD PRESSURE: 110 MMHG | WEIGHT: 184.06 LBS | RESPIRATION RATE: 20 BRPM

## 2023-02-26 DIAGNOSIS — K08.89 DENTALGIA: Primary | ICD-10-CM

## 2023-02-26 PROCEDURE — 25000003 PHARM REV CODE 250: Performed by: PHYSICIAN ASSISTANT

## 2023-02-26 PROCEDURE — 99284 EMERGENCY DEPT VISIT MOD MDM: CPT

## 2023-02-26 RX ORDER — LIDOCAINE HYDROCHLORIDE 20 MG/ML
SOLUTION OROPHARYNGEAL
Qty: 100 ML | Refills: 0 | Status: SHIPPED | OUTPATIENT
Start: 2023-02-26 | End: 2023-09-13 | Stop reason: ALTCHOICE

## 2023-02-26 RX ORDER — LIDOCAINE HYDROCHLORIDE 20 MG/ML
SOLUTION OROPHARYNGEAL
Qty: 100 ML | Refills: 0 | Status: SHIPPED | OUTPATIENT
Start: 2023-02-26 | End: 2023-02-26 | Stop reason: SDUPTHER

## 2023-02-26 RX ORDER — CHLORHEXIDINE GLUCONATE ORAL RINSE 1.2 MG/ML
15 SOLUTION DENTAL 2 TIMES DAILY
Qty: 420 ML | Refills: 0 | Status: SHIPPED | OUTPATIENT
Start: 2023-02-26 | End: 2023-03-12

## 2023-02-26 RX ORDER — AMOXICILLIN AND CLAVULANATE POTASSIUM 875; 125 MG/1; MG/1
1 TABLET, FILM COATED ORAL 2 TIMES DAILY
Qty: 14 TABLET | Refills: 0 | Status: SHIPPED | OUTPATIENT
Start: 2023-02-26 | End: 2023-02-26 | Stop reason: SDUPTHER

## 2023-02-26 RX ORDER — HYDROCODONE BITARTRATE AND ACETAMINOPHEN 10; 325 MG/1; MG/1
1 TABLET ORAL
Status: COMPLETED | OUTPATIENT
Start: 2023-02-26 | End: 2023-02-26

## 2023-02-26 RX ORDER — CHLORHEXIDINE GLUCONATE ORAL RINSE 1.2 MG/ML
15 SOLUTION DENTAL 2 TIMES DAILY
Qty: 420 ML | Refills: 0 | Status: SHIPPED | OUTPATIENT
Start: 2023-02-26 | End: 2023-02-26 | Stop reason: SDUPTHER

## 2023-02-26 RX ORDER — AMOXICILLIN AND CLAVULANATE POTASSIUM 875; 125 MG/1; MG/1
1 TABLET, FILM COATED ORAL 2 TIMES DAILY
Qty: 14 TABLET | Refills: 0 | Status: SHIPPED | OUTPATIENT
Start: 2023-02-26

## 2023-02-26 RX ADMIN — HYDROCODONE BITARTRATE AND ACETAMINOPHEN 1 TABLET: 10; 325 TABLET ORAL at 10:02

## 2023-02-27 NOTE — ED PROVIDER NOTES
Encounter Date: 2023       History     Chief Complaint   Patient presents with    Dental Pain     Patient is 7 months pregnant.  Complaint of right upper dental pain, with radiation into right face.       26 yo  31 week pregnant female presents to ED for evaluation of right upper dental pain worsening over the past month. Patient states has cracked tooth with exposed nerve. Denies any fever or drainage. Denies any trouble swallowing. Taking tylenol OTC without relief. Denies any abdominal pain or vaginal bleeding.     The history is provided by the patient. No  was used.   Review of patient's allergies indicates:   Allergen Reactions    Ceclor [cefaclor]      Past Medical History:   Diagnosis Date    Asthma     Polycystic ovarian disease      Past Surgical History:   Procedure Laterality Date     SECTION      TONSILLECTOMY       Family History   Problem Relation Age of Onset    Glaucoma Maternal Grandfather     Glaucoma Paternal Grandfather     Breast cancer Neg Hx     Colon cancer Neg Hx     Ovarian cancer Neg Hx      Social History     Tobacco Use    Smoking status: Every Day     Packs/day: 0.50     Types: Cigarettes    Smokeless tobacco: Never   Substance Use Topics    Alcohol use: Not Currently    Drug use: Not Currently     Comment: month ago, herion usage     Review of Systems   Constitutional:  Negative for chills and fever.   HENT:  Positive for dental problem. Negative for congestion, ear pain, facial swelling, sore throat, trouble swallowing and voice change.    Respiratory:  Negative for cough and shortness of breath.    Cardiovascular: Negative.    Gastrointestinal: Negative.    Musculoskeletal: Negative.    Neurological:  Negative for dizziness and headaches.   All other systems reviewed and are negative.    Physical Exam     Initial Vitals [23]   BP Pulse Resp Temp SpO2   115/81 86 20 98.1 °F (36.7 °C) 99 %      MAP       --         Physical  Exam    Vitals reviewed.  Constitutional: She appears well-developed and well-nourished.   HENT:   Head: Normocephalic and atraumatic.   Right Ear: Tympanic membrane and external ear normal.   Left Ear: Tympanic membrane and external ear normal.   Mouth/Throat: Uvula is midline, oropharynx is clear and moist and mucous membranes are normal. No trismus in the jaw. Dental abscesses and dental caries present. No uvula swelling. No oropharyngeal exudate, posterior oropharyngeal edema or posterior oropharyngeal erythema.       Eyes: Conjunctivae are normal. Pupils are equal, round, and reactive to light.   Neck: Neck supple.   Normal range of motion.  Cardiovascular:  Normal rate, regular rhythm and normal heart sounds.           Pulmonary/Chest: Breath sounds normal. She has no wheezes. She has no rhonchi. She has no rales.   Abdominal: Abdomen is soft. Bowel sounds are normal. There is no abdominal tenderness.   Musculoskeletal:         General: Normal range of motion.      Cervical back: Normal range of motion and neck supple.     Neurological: She is alert and oriented to person, place, and time.   Skin: Skin is warm and dry.   Psychiatric: She has a normal mood and affect.       ED Course   Procedures  Labs Reviewed - No data to display       Imaging Results    None          Medications   HYDROcodone-acetaminophen  mg per tablet 1 tablet (has no administration in time range)     Medical Decision Making:   Initial Assessment:   28 yo  31 week pregnant female presents to ED for evaluation of right upper dental pain worsening over the past month. Patient states has cracked tooth with exposed nerve. Denies any fever or drainage. Denies any trouble swallowing. Taking tylenol OTC without relief. Denies any abdominal pain or vaginal bleeding.   Differential Diagnosis:   Dental pain, dental fracture, dental abscess.   ED Management:  Patient afebrile and in NAD. Patient with multiple dental caries. Exposed nerve  noted. No abscess with fluctuance. Will place on antibiotics to cover for infection. Given peridex. Discussed risks of giving dose of norco, patient requesting dose of pain medication here. Will give one dose here and sent home with topical lidocaine. Given dental resources. Return to ED precautions given.                         Clinical Impression:   Final diagnoses:  [K08.89] Dentalgia (Primary)        ED Disposition Condition    Discharge Stable          ED Prescriptions       Medication Sig Dispense Start Date End Date Auth. Provider    amoxicillin-clavulanate 875-125mg (AUGMENTIN) 875-125 mg per tablet  (Status: Discontinued) Take 1 tablet by mouth 2 (two) times daily. 14 tablet 2/26/2023 2/26/2023 FERDINAND Delarosa    chlorhexidine (PERIDEX) 0.12 % solution  (Status: Discontinued) Use as directed 15 mLs in the mouth or throat 2 (two) times daily. for 14 days 420 mL 2/26/2023 2/26/2023 FERDINAND Delarosa    LIDOcaine HCl 2% (LIDOCAINE VISCOUS) 2 % Soln  (Status: Discontinued) by Mucous Membrane route every 3 (three) hours. 100 mL 2/26/2023 2/26/2023 FERDINAND Delarosa    amoxicillin-clavulanate 875-125mg (AUGMENTIN) 875-125 mg per tablet Take 1 tablet by mouth 2 (two) times daily. 14 tablet 2/26/2023 -- FERDINAND Delarosa    chlorhexidine (PERIDEX) 0.12 % solution Use as directed 15 mLs in the mouth or throat 2 (two) times daily. for 14 days 420 mL 2/26/2023 3/12/2023 FERDINAND Delarosa    LIDOcaine HCl 2% (LIDOCAINE VISCOUS) 2 % Soln by Mucous Membrane route every 3 (three) hours. 100 mL 2/26/2023 -- FERDINAND Delarosa          Follow-up Information       Follow up With Specialties Details Why Contact Info    Dentist   7-10 days, As needed     Yuriy Dorsey Jr., MD Obstetrics and Gynecology Call   [address] [facility] [address] [phone]               FERDINAND Delarosa  02/26/23 2143       FERDINAND Delarosa  02/26/23 2144

## 2023-02-27 NOTE — DISCHARGE INSTRUCTIONS
Soak cotton ball in viscous lidocaine and benadryl to help numb the pain. Take Tylenol for pain. Take full course of antibiotics.     [facility] [address] [place] [address] [phone] Medicaid/Medicare   Dr. Joshua Smith, DDS  [address] [place] [address] [phone] Medicaid   [facility] [address] [place] [address] [phone] Medicaid   [facility] [address] [place] [address] [phone] Medicare [facility].  [address] [place] [address] [phone] Medicaid/Medicare   [facility] [address] [place] [address] [phone] Medicaid   [facility]  [address]  [place], [place] [address] [phone] Medicaid for ages 5 and under; or for lip/tongue tie    Dr. Ashu Adams, DDS [address] [place] [address] [phone] Medicaid-only for children ages 2 to 21   [facility] [address]  [place] [address] [phone] Medicaid   [facility] [address] [place] [address] [phone] Medicare [facility] [address] [place] [address] [phone] Medicaid   [facility] [address] [place] [address] [phone] Medicaid-Pediatric dentistry    [facility] [address] [place] [address] [phone] Medicaid/Medicare   [facility] [address] [place] [address] [phone] Medicaid/Medicare   [facility] [address]  [place] [address] [phone] Medicaid/Medicare   [facility] [address] [place] [address] [phone] Medicaid/Medicare   [place] [address] [address] [phone] Medicaid/Medicare   [facility] [address]  [place] [address] [phone] Medicaid-Pediatric and adult   [facility] [address]  [place] [address] [phone] Medicaid   [facility] [address]  [place], [place] [address] [phone] Medicare   Dr. Carol Singh,  [place] [address] [phone] Medicare for dentures only

## 2023-04-13 ENCOUNTER — HOSPITAL ENCOUNTER (EMERGENCY)
Facility: HOSPITAL | Age: 28
Discharge: HOME | End: 2023-04-13
Attending: SPECIALIST
Payer: MEDICAID

## 2023-04-13 VITALS
RESPIRATION RATE: 18 BRPM | HEART RATE: 100 BPM | BODY MASS INDEX: 29.95 KG/M2 | WEIGHT: 169 LBS | SYSTOLIC BLOOD PRESSURE: 120 MMHG | TEMPERATURE: 98 F | DIASTOLIC BLOOD PRESSURE: 85 MMHG | HEIGHT: 63 IN

## 2023-04-13 DIAGNOSIS — Z34.90 PREGNANT AND NOT YET DELIVERED: ICD-10-CM

## 2023-04-13 PROCEDURE — 99284 EMERGENCY DEPT VISIT MOD MDM: CPT

## 2023-04-13 RX ORDER — QUETIAPINE FUMARATE 25 MG/1
TABLET, FILM COATED ORAL
COMMUNITY

## 2023-04-13 RX ORDER — DULOXETIN HYDROCHLORIDE 20 MG/1
20 CAPSULE, DELAYED RELEASE ORAL DAILY
COMMUNITY

## 2023-04-14 NOTE — ED PROVIDER NOTES
"       MARCEI NOTE     Admit Date: 2023  MARCIE Physician: Drik Wellington  Primary OBGYN: No Attending/UNASSIGNED,    Admit Diagnosis/Chief Complaint:       Chief Complaint   Patient presents with    pt c/o ctx starting around 0500 this am, states had 3 ctx s       Hospital Course:  Concepcion Gonsales is a 27 y.o.  at 39w1d presents complaining of several contractions today.  She reports 3 were strong.  She has not been timing the to contractions and they did not seem to have a pattern.    This IUP is complicated by very limited prenatal care.  Patient is also a confirmed substance abuser and reports using heroin 3 days ago.  Patient reports she is had 3 prior  section deliveries.    Patient denies vaginal bleeding, leakage of fluid, headache, vision changes, RUQ pain, dysuria, fever, and nausea/vomiting.  Fetal Movement: normal.      /85 (BP Location: Left arm, Patient Position: Lying)   Pulse 100   Temp 98.1 °F (36.7 °C) (Oral)   Resp 18   Ht 5' 3" (1.6 m)   Wt 76.7 kg (169 lb)   LMP 2022 (Approximate)   BMI 29.94 kg/m²   Temp:  [98.1 °F (36.7 °C)] 98.1 °F (36.7 °C)  Pulse:  [100] 100  Resp:  [18] 18  BP: (120)/(85) 120/85    General: in no respiratory distress and acyanotic  Cardiovascular: regular rate and rhythm no murmurs  Respiratory: clear to auscultation, no wheezes, rales or rhonchi, symmetric air entry  Abdominal: FHT present  Back: lumbar tenderness present CVA tenderness none  Extremeties no redness or tenderness in the calves or thighs    SSE:   SVE:  .5 cm, 60%,-3, membranes intact      FHT: Category 1  TOCO:  Occasional uterine contractions noted.  No specific pattern.  Some mild uterine irritability also noted.    Medical Decision Making:  Patient will have an ultrasound to confirm estimated gestational age.  Prenatal care labs will also be drawn.  Patient will be given IV fluid bolus and observed.  If contractions increase in frequency or cervical changes occur she " may be admitted.    Patient's contractions continued to be irregular.  An ultrasound was done with estimated gestational age approximately 36 weeks.  Patient declined to stay for IV fluids or prenatal labs.  She will return in the morning for follow-up    LABS:   No results found for this or any previous visit (from the past 24 hour(s)).  [unfilled]     Imaging Results    None          ASSESMENT: Concepcion Gonsales is a 27 y.o.   at 39w1d with uterine contractions.    Discharge Diagnosis/Clinical Impression:  Pregnancy 39 weeks.  Uterine inertia.  Limited prenatal care.  History of substance abuse.    Status:Stable    Disposition:  discharged to home    Medications:       Patient Instructions:   Current Discharge Medication List        CONTINUE these medications which have NOT CHANGED    Details   acetaminophen (TYLENOL) 500 MG tablet Take 1 tablet (500 mg total) by mouth every 6 (six) hours as needed.  Qty: 20 tablet, Refills: 0      amoxicillin-clavulanate 875-125mg (AUGMENTIN) 875-125 mg per tablet Take 1 tablet by mouth 2 (two) times daily.  Qty: 14 tablet, Refills: 0      buprenorphine HCL (SUBUTEX) 2 mg Subl Place 8 mg under the tongue 3 (three) times daily.      DULoxetine (CYMBALTA) 20 MG capsule Take 20 mg by mouth once daily.      fluticasone (FLONASE) 50 mcg/actuation nasal spray 1 spray by Each Nare route 2 (two) times daily as needed for Rhinitis.  Qty: 15 g, Refills: 0    Associated Diagnoses: Asthmatic bronchitis with acute exacerbation, unspecified asthma severity, unspecified whether persistent      glycerin pediatric suppository Place 1 suppository rectally as needed for Constipation.  Qty: 10 suppository, Refills: 0      hydrocortisone 2.5 % cream Apply topically 2 (two) times daily.  Qty: 20 g, Refills: 0    Associated Diagnoses: Dermatitis      LIDOcaine HCl 2% (LIDOCAINE VISCOUS) 2 % Soln by Mucous Membrane route every 3 (three) hours.  Qty: 100 mL, Refills: 0      prenatal  21-iron fu-folic acid (PRENATAL COMPLETE) 14 mg iron- 400 mcg Tab Take 1 tablet by mouth once daily.  Qty: 30 tablet, Refills: 0      prenatal 25-iron fum-folic-dha (PRENATAL-1) -200 mg-mcg-mg Cap Take 1 capsule by mouth once daily.  Qty: 30 capsule, Refills: 9    Associated Diagnoses: Positive pregnancy test      QUEtiapine (SEROQUEL) 25 MG Tab Take by mouth.             - Pt was given routine pregnancy instructions including to return to triage if she had any vaginal bleeding (other than spotting for the next 48hrs), any loss of fluid like her water broke, decreased fetal movement, or contractions Q 5min lasting for 2 or more hours. Pt was also instructed to drink copious water. Patient voiced understanding of all these instructions and was subsequently discharged home.    She will follow up tomorrow for re-evaluation and prenatal labs.  No discharge procedures on file.    Dirk Wellington MD  OB-GYN Hospitalist       Dirk Wellington MD  04/13/23 [id number]

## 2023-08-27 PROBLEM — H00.011 HORDEOLUM EXTERNUM OF RIGHT UPPER EYELID: Status: ACTIVE | Noted: 2023-08-27
